# Patient Record
Sex: FEMALE | Race: ASIAN | NOT HISPANIC OR LATINO | Employment: STUDENT | URBAN - METROPOLITAN AREA
[De-identification: names, ages, dates, MRNs, and addresses within clinical notes are randomized per-mention and may not be internally consistent; named-entity substitution may affect disease eponyms.]

---

## 2022-05-09 ENCOUNTER — TRANSFERRED RECORDS (OUTPATIENT)
Dept: HEALTH INFORMATION MANAGEMENT | Facility: CLINIC | Age: 22
End: 2022-05-09

## 2022-05-09 ENCOUNTER — MEDICAL CORRESPONDENCE (OUTPATIENT)
Dept: HEALTH INFORMATION MANAGEMENT | Facility: CLINIC | Age: 22
End: 2022-05-09

## 2022-05-14 ENCOUNTER — TRANSCRIBE ORDERS (OUTPATIENT)
Dept: OTHER | Age: 22
End: 2022-05-14

## 2022-05-14 DIAGNOSIS — K59.00 DIFFICULT BOWEL MOVEMENTS: Primary | ICD-10-CM

## 2022-05-19 ENCOUNTER — TELEPHONE (OUTPATIENT)
Dept: SURGERY | Facility: CLINIC | Age: 22
End: 2022-05-19

## 2022-05-19 NOTE — TELEPHONE ENCOUNTER
LVM (no mychart) for scheduling referral from [facility]. Schedule as:  With: Lucrecia Forde NP  Type: UMP NEW, next available  Appt notes: anoscopy

## 2022-09-12 NOTE — TELEPHONE ENCOUNTER
REFERRAL INFORMATION:    Referring Provider:  Kathi Toro PA-C     Referring Clinic:  [facility]     Reason for Visit/Diagnosis: Difficult bowel movement        FUTURE VISIT INFORMATION:    Appointment Date: 9/16/2022    Appointment Time: 10 AM      NOTES RECORD STATUS  DETAILS   OFFICE NOTE from Referring Provider Received 5/9/2022 Office visit with Kathi Toro PA-C     OFFICE NOTE from Other Specialists N/A    HOSPITAL DISCHARGE SUMMARY/ ED VISITS  N/A    OPERATIVE REPORT N/A    ENDOSCOPY (EGD)  N/A    PERTINENT LABS N/A    PATHOLOGY REPORTS (RELATED) N/A    IMAGING (CT, MRI, US, XR)  N/A

## 2022-09-15 NOTE — TELEPHONE ENCOUNTER
Diagnosis, Referred by & from: Difficulty with Stools, possible flex   Appt date: 9/21/2022   NOTES STATUS DETAILS   OFFICE NOTE from referring provider [facility]:  5/9/22  - PCC OV with ADELA Odell   OFFICE NOTE from other specialist [facility]:  9/16/22  - GI OV with Dr. Robles   DISCHARGE SUMMARY from hospital N/A    DISCHARGE REPORT from the ER N/A    OPERATIVE REPORT N/A    MEDICATION LIST Internal    LABS N/A    DIAGNOSTIC PROCEDURES N/A    IMAGING (DISC & REPORT) N/A

## 2022-09-16 ENCOUNTER — PRE VISIT (OUTPATIENT)
Dept: SURGERY | Facility: CLINIC | Age: 22
End: 2022-09-16

## 2022-09-21 ENCOUNTER — PRE VISIT (OUTPATIENT)
Dept: SURGERY | Facility: CLINIC | Age: 22
End: 2022-09-21

## 2022-09-27 ENCOUNTER — TELEPHONE (OUTPATIENT)
Dept: SURGERY | Facility: CLINIC | Age: 22
End: 2022-09-27

## 2022-09-27 NOTE — TELEPHONE ENCOUNTER
Called patient to help reschedule her missed appointment. Schedule next available new P1 patient visit type with Lucrecia Al NP. Could not reach pt, left voicemail with .

## 2022-10-04 NOTE — TELEPHONE ENCOUNTER
Tried giving another call to reschedule patient. Someone answered and then hung up, unable to reach pt or leave voicemail.